# Patient Record
Sex: MALE | Race: WHITE | NOT HISPANIC OR LATINO | Employment: FULL TIME | URBAN - NONMETROPOLITAN AREA
[De-identification: names, ages, dates, MRNs, and addresses within clinical notes are randomized per-mention and may not be internally consistent; named-entity substitution may affect disease eponyms.]

---

## 2017-01-10 ENCOUNTER — OFFICE VISIT (OUTPATIENT)
Dept: RETAIL CLINIC | Facility: CLINIC | Age: 42
End: 2017-01-10

## 2017-01-10 VITALS
TEMPERATURE: 98.2 F | RESPIRATION RATE: 18 BRPM | DIASTOLIC BLOOD PRESSURE: 80 MMHG | OXYGEN SATURATION: 98 % | HEART RATE: 86 BPM | SYSTOLIC BLOOD PRESSURE: 130 MMHG

## 2017-01-10 DIAGNOSIS — K11.20 SIALADENITIS: Primary | ICD-10-CM

## 2017-01-10 PROCEDURE — 99203 OFFICE O/P NEW LOW 30 MIN: CPT | Performed by: NURSE PRACTITIONER

## 2017-01-10 RX ORDER — CEPHALEXIN 500 MG/1
500 CAPSULE ORAL 3 TIMES DAILY
Qty: 30 CAPSULE | Refills: 0 | Status: SHIPPED | OUTPATIENT
Start: 2017-01-10 | End: 2017-01-20

## 2017-01-10 NOTE — PATIENT INSTRUCTIONS
Salivary Gland Infection  A salivary gland infection is an infection in one or more of the glands that produce spit (saliva). You have six major salivary glands. Each gland has a duct that carries saliva into your mouth. Saliva keeps your mouth moist and breaks down the food that you eat. It also helps to prevent tooth decay.  Two salivary glands are located just in front of your ears (parotid). The ducts for these glands open up inside your cheeks, near your back teeth. You also have two glands under your tongue (sublingual) and two glands under your jaw (submandibular). The ducts for these glands open under your tongue. Any salivary gland can become infected. Most infections occur in the parotid glands or submandibular glands.  CAUSES  Salivary glands can be infected by viruses or bacteria.  · The mumps virus is the most common cause of viral salivary gland infections, though mumps is now rare in many areas because of vaccination.    This infection causes swelling in both parotid glands.    Viral infections are more common in children.  · The bacteria that cause salivary gland infections are usually the same bacteria that normally live in your mouth.    A stone can form in a salivary gland and block the flow of saliva. As a result, saliva backs up into the salivary gland. Bacteria may then start to grow behind the blockage and cause infection.    Bacterial infections usually cause pain and swelling on one side of the face. Submandibular gland swelling occurs under the jaw. Parotid swelling occurs in front of the ear.    Bacterial infections are more common in adults.  RISK FACTORS  Children who do not get the MMR (measles, mumps, rubella) vaccine are more likely to get mumps, which can cause a viral salivary gland infection.  Risk factors for bacterial infections include:  · Poor dental care (oral hygiene).  · Smoking.  · Not drinking enough water.  · Having a disease that causes dry mouth and dry eyes (Agustín  syndrome or Sjogren syndrome).  SIGNS AND SYMPTOMS  The main sign of salivary gland infection is a swollen salivary gland. This type of inflammation is often called sialadenitis. You may have swelling in front of your ear, under your jaw, or under your tongue. Swelling may get worse when you eat and decrease after you eat. Other signs and symptoms include:  · Pain.  · Tenderness.  · Redness.  · Dry mouth.  · Bad taste in your mouth.  · Difficulty chewing and swallowing.  · Fever.  DIAGNOSIS  Your health care provider may suspect a salivary gland infection based on your signs and symptoms. He or she will also do a physical exam. The health care provider will look and feel inside your mouth to see whether a stone is blocking a salivary gland duct. You may need to see an ear, nose, and throat specialist (ENT or otolaryngologist) for diagnosis and treatment. You may also need to have diagnostic tests, such as:  · An X-ray to check for a stone.  · Other imaging studies to look for an abscess and to rule out other causes of swelling. These tests may include:    Ultrasound.    CT scan.    MRI.  · Culture and sensitivity test. This involves collecting a sample of pus for testing in the lab to see what bacteria grow and what antibiotics they are sensitive to. The testing sample may be:    Swabbed from a salivary gland duct.    Withdrawn from a swollen gland with a needle (aspiration).  TREATMENT  Viral salivary gland infections usually clear up without treatment. Bacterial infections are usually treated with antibiotic medicine. Severe infections that cause difficulty with swallowing may be treated with an IV antibiotic in the hospital.  Other treatments may include:  · Probing and widening the salivary duct to allow a stone to pass. In some cases, a thin, flexible scope (endoscope) may be inserted into the duct to find a stone and remove it.  · Breaking up a stone using sound waves.  · Draining an infected gland (abscess)  with a needle.  · In some cases, you may need surgery so your health care provider can:    Remove a stone.    Drain pus from an abscess.    Remove a badly infected gland.  HOME CARE INSTRUCTIONS  · Take medicines only as directed by your health care provider.  · If you were prescribed an antibiotic medicine, finish it all even if you start to feel better.  · Follow these instructions every few hours:    Suck on a lemon candy to stimulate the flow of saliva.    Put a warm compress over the gland.    Gently massage the gland.  · Drink enough fluid to keep your urine clear or pale yellow.  · Rinse your mouth with a mixture of warm water and salt every few hours. To make this mixture, add a pinch of salt to 1 cup of warm water.  · Practice good oral hygiene by brushing and flossing your teeth after meals and before you go to bed.  · Do not use any tobacco products, including cigarettes, chewing tobacco, or electronic cigarettes. If you need help quitting, ask your health care provider.  SEEK MEDICAL CARE IF:  · You have pain and swelling in your face, jaw, or mouth after eating.  · You have persistent swelling in any of these places:    In front of your ear.    Under your jaw.    Inside your mouth.  SEEK IMMEDIATE MEDICAL CARE IF:   · You have pain and swelling in your face, jaw, or mouth that are getting worse.  · Your pain and swelling make it hard to swallow or breathe.     This information is not intended to replace advice given to you by your health care provider. Make sure you discuss any questions you have with your health care provider.     Document Released: 01/25/2006 Document Revised: 01/08/2016 Document Reviewed: 05/20/2015  [employer] Interactive Patient Education ©2016 [employer]

## 2017-01-10 NOTE — PROGRESS NOTES
Subjective     Sidney Meyer is a 41 y.o. male who presents to the clinic with:      Jaw Pain   This is a recurrent problem. Episode onset: 2 weeks ago.Has this intermittently. The problem occurs constantly. The problem has been gradually worsening. Pertinent negatives include no chills, fatigue, fever, myalgias, nausea or vomiting. Associated symptoms comments: Has noticed some pain and slight swelling right jaw. States that one salivary stone came out last week, thinks another must still be there.. Treatments tried: Lemon drops. The treatment provided mild relief.          The following portions of the patient's history were reviewed and updated as appropriate: allergies, current medications, past family history, past medical history, past social history, past surgical history and problem list.      Review of Systems   Constitutional: Negative for chills, fatigue and fever.   HENT: Negative for dental problem.    Gastrointestinal: Negative for nausea and vomiting.   Musculoskeletal: Negative for myalgias.   All other systems reviewed and are negative.        Objective   Physical Exam   Constitutional: He is oriented to person, place, and time. He appears well-developed and well-nourished.   HENT:   Head: Normocephalic.   Mouth/Throat: No oropharyngeal exudate, posterior oropharyngeal edema, posterior oropharyngeal erythema or tonsillar abscesses.   Slight edema right jaw, no erythema noted   Neck: Neck supple.   Cardiovascular: Normal rate and regular rhythm.    Pulmonary/Chest: Effort normal.   Lymphadenopathy:     He has no cervical adenopathy.   Neurological: He is alert and oriented to person, place, and time.   Skin: Skin is warm and dry.   Psychiatric: He has a normal mood and affect. His behavior is normal.   Vitals reviewed.        Assessment/Plan   Sidney was seen today for jaw pain.    Diagnoses and all orders for this visit:    Sialadenitis    Other orders  -     cephalexin (KEFLEX) 500 MG capsule; Take 1  capsule by mouth 3 (Three) Times a Day for 10 days.          Patient Instructions   Salivary Gland Infection  A salivary gland infection is an infection in one or more of the glands that produce spit (saliva). You have six major salivary glands. Each gland has a duct that carries saliva into your mouth. Saliva keeps your mouth moist and breaks down the food that you eat. It also helps to prevent tooth decay.  Two salivary glands are located just in front of your ears (parotid). The ducts for these glands open up inside your cheeks, near your back teeth. You also have two glands under your tongue (sublingual) and two glands under your jaw (submandibular). The ducts for these glands open under your tongue. Any salivary gland can become infected. Most infections occur in the parotid glands or submandibular glands.  CAUSES  Salivary glands can be infected by viruses or bacteria.  · The mumps virus is the most common cause of viral salivary gland infections, though mumps is now rare in many areas because of vaccination.    This infection causes swelling in both parotid glands.    Viral infections are more common in children.  · The bacteria that cause salivary gland infections are usually the same bacteria that normally live in your mouth.    A stone can form in a salivary gland and block the flow of saliva. As a result, saliva backs up into the salivary gland. Bacteria may then start to grow behind the blockage and cause infection.    Bacterial infections usually cause pain and swelling on one side of the face. Submandibular gland swelling occurs under the jaw. Parotid swelling occurs in front of the ear.    Bacterial infections are more common in adults.  RISK FACTORS  Children who do not get the MMR (measles, mumps, rubella) vaccine are more likely to get mumps, which can cause a viral salivary gland infection.  Risk factors for bacterial infections include:  · Poor dental care (oral hygiene).  · Smoking.  · Not  drinking enough water.  · Having a disease that causes dry mouth and dry eyes (Mikulicz syndrome or Sjogren syndrome).  SIGNS AND SYMPTOMS  The main sign of salivary gland infection is a swollen salivary gland. This type of inflammation is often called sialadenitis. You may have swelling in front of your ear, under your jaw, or under your tongue. Swelling may get worse when you eat and decrease after you eat. Other signs and symptoms include:  · Pain.  · Tenderness.  · Redness.  · Dry mouth.  · Bad taste in your mouth.  · Difficulty chewing and swallowing.  · Fever.  DIAGNOSIS  Your health care provider may suspect a salivary gland infection based on your signs and symptoms. He or she will also do a physical exam. The health care provider will look and feel inside your mouth to see whether a stone is blocking a salivary gland duct. You may need to see an ear, nose, and throat specialist (ENT or otolaryngologist) for diagnosis and treatment. You may also need to have diagnostic tests, such as:  · An X-ray to check for a stone.  · Other imaging studies to look for an abscess and to rule out other causes of swelling. These tests may include:    Ultrasound.    CT scan.    MRI.  · Culture and sensitivity test. This involves collecting a sample of pus for testing in the lab to see what bacteria grow and what antibiotics they are sensitive to. The testing sample may be:    Swabbed from a salivary gland duct.    Withdrawn from a swollen gland with a needle (aspiration).  TREATMENT  Viral salivary gland infections usually clear up without treatment. Bacterial infections are usually treated with antibiotic medicine. Severe infections that cause difficulty with swallowing may be treated with an IV antibiotic in the hospital.  Other treatments may include:  · Probing and widening the salivary duct to allow a stone to pass. In some cases, a thin, flexible scope (endoscope) may be inserted into the duct to find a stone and remove  it.  · Breaking up a stone using sound waves.  · Draining an infected gland (abscess) with a needle.  · In some cases, you may need surgery so your health care provider can:    Remove a stone.    Drain pus from an abscess.    Remove a badly infected gland.  HOME CARE INSTRUCTIONS  · Take medicines only as directed by your health care provider.  · If you were prescribed an antibiotic medicine, finish it all even if you start to feel better.  · Follow these instructions every few hours:    Suck on a lemon candy to stimulate the flow of saliva.    Put a warm compress over the gland.    Gently massage the gland.  · Drink enough fluid to keep your urine clear or pale yellow.  · Rinse your mouth with a mixture of warm water and salt every few hours. To make this mixture, add a pinch of salt to 1 cup of warm water.  · Practice good oral hygiene by brushing and flossing your teeth after meals and before you go to bed.  · Do not use any tobacco products, including cigarettes, chewing tobacco, or electronic cigarettes. If you need help quitting, ask your health care provider.  SEEK MEDICAL CARE IF:  · You have pain and swelling in your face, jaw, or mouth after eating.  · You have persistent swelling in any of these places:    In front of your ear.    Under your jaw.    Inside your mouth.  SEEK IMMEDIATE MEDICAL CARE IF:   · You have pain and swelling in your face, jaw, or mouth that are getting worse.  · Your pain and swelling make it hard to swallow or breathe.     This information is not intended to replace advice given to you by your health care provider. Make sure you discuss any questions you have with your health care provider.     Document Released: 01/25/2006 Document Revised: 01/08/2016 Document Reviewed: 05/20/2015  [employer] Interactive Patient Education ©2016 [employer]

## 2017-07-08 ENCOUNTER — OFFICE VISIT (OUTPATIENT)
Dept: URGENT CARE | Age: 42
End: 2017-07-08
Payer: COMMERCIAL

## 2017-07-08 VITALS
BODY MASS INDEX: 41.43 KG/M2 | HEART RATE: 80 BPM | WEIGHT: 273.4 LBS | DIASTOLIC BLOOD PRESSURE: 88 MMHG | TEMPERATURE: 99 F | HEIGHT: 68 IN | SYSTOLIC BLOOD PRESSURE: 131 MMHG | OXYGEN SATURATION: 96 % | RESPIRATION RATE: 20 BRPM

## 2017-07-08 DIAGNOSIS — K11.5 SALIVARY GLAND STONE: Primary | ICD-10-CM

## 2017-07-08 PROCEDURE — 99202 OFFICE O/P NEW SF 15 MIN: CPT | Performed by: SPECIALIST

## 2017-07-08 RX ORDER — AMOXICILLIN AND CLAVULANATE POTASSIUM 875; 125 MG/1; MG/1
1 TABLET, FILM COATED ORAL EVERY 12 HOURS
Qty: 20 TABLET | Refills: 0 | Status: SHIPPED | OUTPATIENT
Start: 2017-07-08 | End: 2017-07-18

## 2017-07-08 RX ORDER — ATORVASTATIN CALCIUM 80 MG/1
TABLET, FILM COATED ORAL
Refills: 3 | COMMUNITY
Start: 2017-07-03

## 2017-07-08 ASSESSMENT — ENCOUNTER SYMPTOMS
SINUS PRESSURE: 0
SWOLLEN GLANDS: 1
RHINORRHEA: 0
SORE THROAT: 1
COUGH: 0

## 2018-11-02 ENCOUNTER — OFFICE VISIT (OUTPATIENT)
Dept: RETAIL CLINIC | Facility: CLINIC | Age: 43
End: 2018-11-02

## 2018-11-02 VITALS — DIASTOLIC BLOOD PRESSURE: 60 MMHG | RESPIRATION RATE: 18 BRPM | HEART RATE: 86 BPM | SYSTOLIC BLOOD PRESSURE: 112 MMHG

## 2018-11-02 DIAGNOSIS — E78.5 HYPERLIPIDEMIA, UNSPECIFIED HYPERLIPIDEMIA TYPE: Primary | ICD-10-CM

## 2018-11-02 PROCEDURE — 99213 OFFICE O/P EST LOW 20 MIN: CPT | Performed by: NURSE PRACTITIONER

## 2018-11-02 RX ORDER — ATORVASTATIN CALCIUM 80 MG/1
TABLET, FILM COATED ORAL
COMMUNITY
Start: 2017-07-03 | End: 2018-12-27 | Stop reason: SDUPTHER

## 2018-11-02 RX ORDER — ATORVASTATIN CALCIUM 80 MG/1
80 TABLET, FILM COATED ORAL DAILY
Qty: 90 TABLET | Refills: 0 | Status: SHIPPED | OUTPATIENT
Start: 2018-11-02 | End: 2019-01-29 | Stop reason: SDUPTHER

## 2018-11-02 NOTE — PROGRESS NOTES
Subjective     Sidney Meyer is a 43 y.o. male who presents to the clinic with:      Patient is a 43 year old male who presents needing a refill of his Lipitor 80 mg. His current prescription was from his provider in [place] and he is not able to refill it at this time since patient has now re-located to [place]. Mr. Meyer states that he has had some lab work performed recently. He is also wanting to get established with a primary provider here in [place]. He denies any other concerns at this time.            The following portions of the patient's history were reviewed and updated as appropriate: allergies, current medications, past family history, past medical history, past social history, past surgical history and problem list.      Review of Systems   Constitutional: Negative for chills, fatigue and fever.   Respiratory: Negative for cough, chest tightness and shortness of breath.    Gastrointestinal: Negative for abdominal pain, nausea and vomiting.   Musculoskeletal: Negative for myalgias.   All other systems reviewed and are negative.        Objective   Physical Exam   Constitutional: He is oriented to person, place, and time. He appears well-developed and well-nourished.   HENT:   Head: Normocephalic.   Cardiovascular: Normal rate and regular rhythm.    Pulmonary/Chest: Effort normal.   Neurological: He is alert and oriented to person, place, and time.   Skin: Skin is warm and dry.   Psychiatric: He has a normal mood and affect. His behavior is normal. Judgment and thought content normal.   Vitals reviewed.        Assessment/Plan   Sidney was seen today for med refill.    Diagnoses and all orders for this visit:    Hyperlipidemia, unspecified hyperlipidemia type    Other orders  -     atorvastatin (LIPITOR) 80 MG tablet; Take 1 tablet by mouth Daily for 90 days.      Patient Instructions   Take home medication as prescribed. Patient should be contacted regarding an appointment, let me know if he hasn't heard  anything over the next week.

## 2018-11-02 NOTE — PATIENT INSTRUCTIONS
Take home medication as prescribed. Patient should be contacted regarding an appointment, let me know if he hasn't heard anything over the next week.

## 2018-12-27 ENCOUNTER — OFFICE VISIT (OUTPATIENT)
Dept: INTERNAL MEDICINE | Facility: CLINIC | Age: 43
End: 2018-12-27

## 2018-12-27 VITALS
WEIGHT: 287 LBS | OXYGEN SATURATION: 98 % | HEIGHT: 68 IN | SYSTOLIC BLOOD PRESSURE: 150 MMHG | TEMPERATURE: 97.2 F | DIASTOLIC BLOOD PRESSURE: 88 MMHG | RESPIRATION RATE: 12 BRPM | HEART RATE: 84 BPM | BODY MASS INDEX: 43.5 KG/M2

## 2018-12-27 DIAGNOSIS — R03.0 ELEVATED BLOOD PRESSURE READING: ICD-10-CM

## 2018-12-27 DIAGNOSIS — E66.01 MORBID OBESITY WITH BMI OF 40.0-44.9, ADULT (HCC): ICD-10-CM

## 2018-12-27 DIAGNOSIS — I25.10 CORONARY ARTERY DISEASE INVOLVING NATIVE CORONARY ARTERY OF NATIVE HEART WITHOUT ANGINA PECTORIS: Primary | ICD-10-CM

## 2018-12-27 DIAGNOSIS — E78.49 OTHER HYPERLIPIDEMIA: ICD-10-CM

## 2018-12-27 DIAGNOSIS — R73.03 PREDIABETES: ICD-10-CM

## 2018-12-27 PROBLEM — E78.00 ELEVATED CHOLESTEROL: Status: ACTIVE | Noted: 2018-12-27

## 2018-12-27 LAB — HBA1C MFR BLD: 5.7 %

## 2018-12-27 PROCEDURE — 83036 HEMOGLOBIN GLYCOSYLATED A1C: CPT | Performed by: FAMILY MEDICINE

## 2018-12-27 PROCEDURE — 99204 OFFICE O/P NEW MOD 45 MIN: CPT | Performed by: FAMILY MEDICINE

## 2018-12-27 RX ORDER — ASPIRIN 81 MG/1
81 TABLET ORAL DAILY
COMMUNITY

## 2018-12-27 NOTE — PROGRESS NOTES
CC:   Chief Complaint   Patient presents with   • Establish Care       History:  Sidney Meyer is a 43 y.o. male who presents today for evaluation of the above problems.    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 He was on a beta blocker for 6 months per his report. He says that he gets nervous in doctor's offices, BP elevated today. Had two GENEVIEVE, denies Hx of CHF.       Morbid obesity: Lost 90 pounds after her first heart attack, but says that he is gained most of that back.  Says he is working on getting back to exercising, is also cooking in the way that he did when he lost his weight beforehand.    A1c 5.7 in office today    Declines flu vaccine    ROS:  Review of Systems   Constitutional: Negative.    HENT: Negative.    Eyes: Negative.    Respiratory: Negative for chest tightness and shortness of breath.    Cardiovascular: Negative for chest pain, palpitations and leg swelling.   Gastrointestinal: Negative.    Endocrine: Negative.    Genitourinary: Negative.    Musculoskeletal: Negative.    Skin: Negative.    Allergic/Immunologic: Positive for environmental allergies (moving here).   Neurological: Negative.    Hematological: Negative.        No Known Allergies  Past Medical History:   Diagnosis Date   • Elevated cholesterol    • Myocardial infarction (CMS/HCC) 08/2013     Past Surgical History:   Procedure Laterality Date   • CORONARY ANGIOPLASTY WITH STENT PLACEMENT  08/2013     Family History   Problem Relation Age of Onset   • Heart disease Father    • Heart disease Other       reports that  has never smoked. he has never used smokeless tobacco. He reports that he does not use drugs.      Current Outpatient Medications:   •  aspirin 81 MG EC tablet, Take 81 mg by mouth Daily., Disp: , Rfl:   •  atorvastatin (LIPITOR) 80 MG tablet, Take 1 tablet by mouth Daily for 90 days.,  "Disp: 90 tablet, Rfl: 0  •  Multiple Vitamins-Minerals (PX MENS MULTIVITAMINS PO), Take 1 tablet by mouth Daily., Disp: , Rfl:     OBJECTIVE:  /88 (BP Location: Left arm, Patient Position: Sitting, Cuff Size: Large Adult)   Pulse 84   Temp 97.2 °F (36.2 °C) (Temporal)   Resp 12   Ht 172.7 cm (68\")   Wt 130 kg (287 lb)   SpO2 98%   BMI 43.64 kg/m²      Physical Exam   Constitutional: He is oriented to person, place, and time. No distress.   Morbidly obese   HENT:   Head: Normocephalic and atraumatic.   Nose: Nose normal.   Eyes: Conjunctivae are normal. Right eye exhibits no discharge. Left eye exhibits no discharge. No scleral icterus.   Neck: No tracheal deviation present.   Cardiovascular: Normal rate, regular rhythm and normal heart sounds. Exam reveals no gallop and no friction rub.   No murmur heard.  Pulmonary/Chest: Effort normal and breath sounds normal. No respiratory distress. He has no wheezes. He has no rales.   Abdominal: Soft. Bowel sounds are normal. There is no tenderness.   Musculoskeletal: He exhibits no edema.   Neurological: He is alert and oriented to person, place, and time.   Skin: Skin is warm and dry. He is not diaphoretic. No pallor.   Psychiatric: He has a normal mood and affect. His behavior is normal. Judgment and thought content normal.   Nursing note and vitals reviewed.    Assessment/Plan    Problem List Items Addressed This Visit     Coronary artery disease involving native coronary artery of native heart without angina pectoris - Primary     Stable, continue ASA, statin, monitor home BP         Morbid obesity with BMI of 40.0-44.9, adult (CMS/HCC)     Recommended diet and exercise, says he is on regimen that did improve weight. Readdress in 3 months         Relevant Orders    POC Glycosylated Hemoglobin (Hb A1C) (Completed)    Prediabetes     Monitor diet/exercise progress in 3 months         Other hyperlipidemia     On statin, says he had labs drawn 2 months ago, await " results prior to ordering lipid panel         Elevated blood pressure reading     Says that he BP is usually high at doctor's office, home BP monitoring             Patient's Body mass index is 43.64 kg/m². BMI is above normal parameters. Recommendations include: exercise counseling and nutrition counseling.    An After Visit Summary was printed and given to the patient at discharge.  Return in about 3 months (around 3/27/2019).         Jeffery Andujar D.O.  Family Medicine  Osteopathic Neuromusculoskeletal Medicine  12/27/2018

## 2018-12-27 NOTE — ASSESSMENT & PLAN NOTE
Recommended diet and exercise, says he is on regimen that did improve weight. Readdress in 3 months

## 2019-01-29 RX ORDER — ATORVASTATIN CALCIUM 80 MG/1
80 TABLET, FILM COATED ORAL DAILY
Qty: 90 TABLET | Refills: 1 | Status: SHIPPED | OUTPATIENT
Start: 2019-01-29 | End: 2019-07-30 | Stop reason: SDUPTHER

## 2019-07-30 ENCOUNTER — NURSE TRIAGE (OUTPATIENT)
Dept: CALL CENTER | Facility: HOSPITAL | Age: 44
End: 2019-07-30

## 2019-07-30 RX ORDER — ATORVASTATIN CALCIUM 80 MG/1
80 TABLET, FILM COATED ORAL DAILY
Qty: 30 TABLET | Refills: 1 | Status: SHIPPED | OUTPATIENT
Start: 2019-07-30

## 2019-07-30 NOTE — TELEPHONE ENCOUNTER
Patient called stating that he has moved to [place] now and he will be contacting our office for medical records, but he would like a refill for his Atorvastatin 80 mg and 1 refill to get him through until his first appt with new PCP in [place]. Patient reports that phone numbers listed for him are still correct.    DARCY

## 2019-07-30 NOTE — TELEPHONE ENCOUNTER
"He is in [place], needing refills, his pharmacy says has tried to contact Dr. Andujar office and has not gotten response, told him to call back after 8:30 when office opens to speak to them about refills.    Reason for Disposition  • Caller requesting a NON-URGENT new prescription or refill and triager unable to refill per unit policy    Additional Information  • Negative: Drug overdose and nurse unable to answer question  • Negative: Caller requesting information not related to medicine  • Negative: Caller requesting a prescription for Strep throat and has a positive culture result  • Negative: Rash while taking a medication or within 3 days of stopping it  • Negative: Immunization reaction suspected  • Negative: [1] Asthma and [2] having symptoms of asthma (cough, wheezing, etc)  • Negative: MORE THAN A DOUBLE DOSE of a prescription or over-the-counter (OTC) drug  • Negative: [1] DOUBLE DOSE (an extra dose or lesser amount) of over-the-counter (OTC) drug AND [2] any symptoms (e.g., dizziness, nausea, pain, sleepiness)  • Negative: [1] DOUBLE DOSE (an extra dose or lesser amount) of prescription drug AND [2] any symptoms (e.g., dizziness, nausea, pain, sleepiness)  • Negative: Took another person's prescription drug  • Negative: [1] DOUBLE DOSE (an extra dose or lesser amount) of prescription drug AND [2] NO symptoms (Exception: a double dose of antibiotics)  • Negative: Diabetes drug error or overdose (e.g., insulin or extra dose)  • Negative: [1] Request for URGENT new prescription or refill of \"essential\" medication (i.e., likelihood of harm to patient if not taken) AND [2] triager unable to fill per unit policy  • Negative: [1] Prescription not at pharmacy AND [2] was prescribed today by PCP  • Negative: Pharmacy calling with prescription questions and triager unable to answer question  • Negative: Caller has URGENT medication question about med that PCP prescribed and triager unable to answer question  • " "Negative: Caller has NON-URGENT medication question about med that PCP prescribed and triager unable to answer question  • Negative: Caller has medication question about med not prescribed by PCP and triager unable to answer question (e.g., compatibility with other med, storage)    Answer Assessment - Initial Assessment Questions  1. SYMPTOMS: \"Do you have any symptoms?\"      no  2. SEVERITY: If symptoms are present, ask \"Are they mild, moderate or severe?\"      Needs refills    Protocols used: MEDICATION QUESTION CALL-ADULT-      "